# Patient Record
Sex: FEMALE | Race: WHITE | NOT HISPANIC OR LATINO
[De-identification: names, ages, dates, MRNs, and addresses within clinical notes are randomized per-mention and may not be internally consistent; named-entity substitution may affect disease eponyms.]

---

## 2019-11-20 ENCOUNTER — APPOINTMENT (OUTPATIENT)
Dept: ORTHOPEDIC SURGERY | Facility: CLINIC | Age: 39
End: 2019-11-20
Payer: COMMERCIAL

## 2019-11-20 VITALS
SYSTOLIC BLOOD PRESSURE: 119 MMHG | HEIGHT: 69 IN | TEMPERATURE: 98 F | HEART RATE: 71 BPM | WEIGHT: 180 LBS | DIASTOLIC BLOOD PRESSURE: 75 MMHG | BODY MASS INDEX: 26.66 KG/M2

## 2019-11-20 DIAGNOSIS — Z82.62 FAMILY HISTORY OF OSTEOPOROSIS: ICD-10-CM

## 2019-11-20 DIAGNOSIS — M17.11 UNILATERAL PRIMARY OSTEOARTHRITIS, RIGHT KNEE: ICD-10-CM

## 2019-11-20 DIAGNOSIS — Z87.39 PERSONAL HISTORY OF OTHER DISEASES OF THE MUSCULOSKELETAL SYSTEM AND CONNECTIVE TISSUE: ICD-10-CM

## 2019-11-20 DIAGNOSIS — Z72.89 OTHER PROBLEMS RELATED TO LIFESTYLE: ICD-10-CM

## 2019-11-20 PROCEDURE — 73560 X-RAY EXAM OF KNEE 1 OR 2: CPT | Mod: RT

## 2019-11-20 PROCEDURE — 99203 OFFICE O/P NEW LOW 30 MIN: CPT

## 2019-11-25 NOTE — DISCUSSION/SUMMARY
[de-identified] : At this time, due to patellofemoral arthritis of the right knee, I recommended rest, ice and anti-inflammatory.  She will be reassessed in two to three weeks to discuss the potential for a cortisone injection.

## 2019-11-25 NOTE — HISTORY OF PRESENT ILLNESS
[de-identified] : The patient comes in today with complaints to her right knee.  She states she was doing a lot of sports and lunges and had increasing complaints of pain over the last couple of months.  The patient states the onset/injury occurred on 9/1/19.  This injury is not work related or due to an automobile accident.  The patient states the pain is intermittent and localized.  The patient describes the pain as intermittent sharp pain with activity.  The patient notes rest and elevating the left makes her symptoms better, while getting up from a seated position, lateral movement, going downstairs, putting weight on it and kneeling, makes her symptoms worse.

## 2019-11-25 NOTE — PHYSICAL EXAM
[de-identified] : Ambulating with a slightly antalgic to antalgic gait.  Station:  Normal.  [de-identified] : Left Knee: \par Range of Motion in Degrees	\par 	                  Claimant:	Normal:	\par Flexion Active	  135 	                135-degrees	\par Flexion Passive	  135	                135-degrees	\par Extension Active	  0-5	                0-5-degrees	\par Extension Passive	  0-5	                0-5-degrees	\par \par No weakness to flexion/extension.  No evidence of instability in the AP plane or varus or valgus stress.  Negative  Lachman.  Negative pivot shift.  Negative anterior drawer test.  Negative posterior drawer test.  Negative Tonja.  Negative Apley grind.  No medial or lateral joint line tenderness.  No tenderness over the medial and lateral facet of the patella.  No patellofemoral crepitations.  No lateral tilting patella.  No patellar apprehension.  No crepitation in the medial and lateral femoral condyle.  No proximal or distal swelling, edema or tenderness.  No gross motor or sensory deficits.  No intra-articular swelling.  2+ DP and PT pulses. No varus or valgus malalignment.  Skin is intact.  No rashes, scars or lesions.  \par  \par Right Knee: \par Range of Motion in Degrees	\par 	                  Claimant:	Normal:	\par Flexion Active	  135 	                135-degrees	\par Flexion Passive	  135	                135-degrees	\par Extension Active	  0-5	                0-5-degrees	\par Extension Passive	  0-5	                0-5-degrees	\par \par No weakness to flexion/extension.  No evidence of instability in the AP plane or varus or valgus stress.  Negative  Lachman.  Negative pivot shift.  Negative anterior drawer test.  Negative posterior drawer test.  Negative Tonja.  Negative Apley grind.  No medial or lateral joint line tenderness.  Positive tenderness over the lateral facet of the patella.  No tenderness over the medial facet of the patella.  Positive patellofemoral crepitations.  No lateral tilting patella.  No patella apprehension.  No crepitation in the medial and lateral femoral condyle.  No proximal or distal swelling, edema or tenderness.  No gross motor or sensory deficits.  Moderate effusion.  2+ DP and PT pulses.  No varus or valgus malalignment.  Skin is intact.  No rashes, scars or lesions.  \par  [de-identified] : Appearance:  Well-developed, well-nourished female in no acute distress.\par \par   [de-identified] : Radiographs, two views of the right knee, show no obvious osseous abnormality.

## 2019-11-25 NOTE — ADDENDUM
[FreeTextEntry1] : This note was written by Oneyda Mayfield on 11/25/2019 acting as a scribe for WONG SOLIZ III, MD

## 2019-12-02 ENCOUNTER — APPOINTMENT (OUTPATIENT)
Dept: ORTHOPEDIC SURGERY | Facility: CLINIC | Age: 39
End: 2019-12-02

## 2019-12-04 ENCOUNTER — APPOINTMENT (OUTPATIENT)
Dept: ORTHOPEDIC SURGERY | Facility: CLINIC | Age: 39
End: 2019-12-04

## 2022-06-10 ENCOUNTER — APPOINTMENT (OUTPATIENT)
Dept: INTERNAL MEDICINE | Facility: CLINIC | Age: 42
End: 2022-06-10
Payer: COMMERCIAL

## 2022-06-10 ENCOUNTER — NON-APPOINTMENT (OUTPATIENT)
Age: 42
End: 2022-06-10

## 2022-06-10 VITALS
TEMPERATURE: 97.2 F | BODY MASS INDEX: 30.21 KG/M2 | SYSTOLIC BLOOD PRESSURE: 127 MMHG | HEIGHT: 69 IN | OXYGEN SATURATION: 100 % | RESPIRATION RATE: 15 BRPM | DIASTOLIC BLOOD PRESSURE: 81 MMHG | WEIGHT: 204 LBS | HEART RATE: 78 BPM

## 2022-06-10 DIAGNOSIS — Z00.00 ENCOUNTER FOR GENERAL ADULT MEDICAL EXAMINATION W/OUT ABNORMAL FINDINGS: ICD-10-CM

## 2022-06-10 DIAGNOSIS — Z82.69 FAMILY HISTORY OF OTHER DISEASES OF THE MUSCULOSKELETAL SYSTEM AND CONNECTIVE TISSUE: ICD-10-CM

## 2022-06-10 PROCEDURE — G0442 ANNUAL ALCOHOL SCREEN 15 MIN: CPT

## 2022-06-10 PROCEDURE — 36415 COLL VENOUS BLD VENIPUNCTURE: CPT

## 2022-06-10 PROCEDURE — 93000 ELECTROCARDIOGRAM COMPLETE: CPT | Mod: 59

## 2022-06-10 PROCEDURE — G0444 DEPRESSION SCREEN ANNUAL: CPT | Mod: 59

## 2022-06-10 PROCEDURE — 99386 PREV VISIT NEW AGE 40-64: CPT | Mod: 25

## 2022-06-10 NOTE — ASSESSMENT
[FreeTextEntry1] : Pearl is a 43 yo F w no pertinent PMHx here for CPE and to establish care \par Patient feels well. Her main concern is her weight and the anxiety of being a mom to triplets. She has been on zoloft 50 mg for the past 2 years and was doing fine until recently. She has been attempting to lose weight. Has done weight watchers and was unsuccessful. She is currently doing keto for the past three weeks and has only lost 3 lb. Has been eating less than 20 net carbs daily and three meals. She has also been counting calories and exercising (beach body). She thinks that most of her anxiety at the moment roots from her being on a diet. She denies depression, intentions to harm herself or other \par \par Gyn , had vasa previa, emergent C section \par She is on birth control (combined) \par \par HCM \par Mammo - NORMAL \par Pap smear - NORMAL\par Flu vaccine- Did not have last year\par Tdap- \par Depression screening- negative  \par Alcohol abuse screening- negative \par EKG - nsr\par \par BMI 30 \par  Explained amount of protein fat and carbs that are allowed on eat meal. Needs also to follow a caloric deficit.. Good sources of good fats such as avocado, nuts, fish, good source of protein eggs, white part of chicken, beef from time to time. Good source of carbs low carb wraps, green veggies etc... also needs to start exercising. Recommended around 45 min 4-5 times a week of moderate intensity work outs. Patient understood and agreed with plan.\par Fu 3 mo goal to lose 15 lb \par \par Anxiety \par Patient not interested in increasing zoloft \par Advised therapist \par Engage in relaxing activities, plan in advance, 8 hours of sleep. \par

## 2022-06-10 NOTE — HEALTH RISK ASSESSMENT
[Excellent] : ~his/her~ current health as excellent [Never] : Never [Yes] : Yes [Monthly or less (1 pt)] : Monthly or less (1 point) [1 or 2 (0 pts)] : 1 or 2 (0 points) [Never (0 pts)] : Never (0 points) [No] : In the past 12 months have you used drugs other than those required for medical reasons? No [No falls in past year] : Patient reported no falls in the past year [0] : 2) Feeling down, depressed, or hopeless: Not at all (0) [PHQ-2 Negative - No further assessment needed] : PHQ-2 Negative - No further assessment needed [Patient reported mammogram was normal] : Patient reported mammogram was normal [Patient reported PAP Smear was normal] : Patient reported PAP Smear was normal [HIV test declined] : HIV test declined [Hepatitis C test declined] : Hepatitis C test declined [None] : None [With Family] : lives with family [# of Members in Household ___] :  household currently consist of [unfilled] member(s) [Employed] : employed [College] : College [] :  [# Of Children ___] : has [unfilled] children [Sexually Active] : sexually active [Feels Safe at Home] : Feels safe at home [Fully functional (bathing, dressing, toileting, transferring, walking, feeding)] : Fully functional (bathing, dressing, toileting, transferring, walking, feeding) [With Patient/Caregiver] : , with patient/caregiver [Reviewed updated] : Reviewed, updated [Designated Healthcare Proxy] : Designated healthcare proxy [Name: ___] : Health Care Proxy's Name: [unfilled]  [Relationship: ___] : Relationship: [unfilled] [Aggressive treatment] : aggressive treatment [I will adhere to the patient's wishes.] : I will adhere to the patient's wishes. [Very Good] : ~his/her~  mood as very good [FreeTextEntry1] : Anxiety, weight  [Audit-CScore] : 0 [de-identified] : Working out  [de-identified] : Keto-low carb diet  [[id number]] : 0 [Change in mental status noted] : No change in mental status noted [Language] : denies difficulty with language [Behavior] : denies difficulty with behavior [Learning/Retaining New Information] : denies difficulty learning/retaining new information [Handling Complex Tasks] : denies difficulty handling complex tasks [Reasoning] : denies difficulty with reasoning [Spatial Ability and Orientation] : denies difficulty with spatial ability and orientation [Reports changes in hearing] : Reports no changes in hearing [Reports changes in vision] : Reports no changes in vision [MammogramDate] : 2022 [PapSmearDate] : 10/2021 [FreeTextEntry2] :   [AdvancecareDate] : 6/10/2022

## 2022-06-10 NOTE — HISTORY OF PRESENT ILLNESS
[FreeTextEntry1] : CPE/NP  [de-identified] : Pearl is a 43 yo F w no pertinent PMHx here for CPE and to establish care \par Patient feels well. Her main concern is her weight and the anxiety of being a mom to triplets. She has been on zoloft 50 mg for the past 2 years and was doing fine until recently. She has been attempting to lose weight. Has done weight watchers and was unsuccessful. She is currently doing keto for the past three weeks and has only lost 3 lb. Has been eating less than 20 net carbs daily and three meals. She has also been counting calories and exercising (beach body). She thinks that most of her anxiety at the moment roots from her being on a diet. She denies depression, intentions to harm herself or other \par \par Gyn , had vasa previa, emergent C section \par She is on birth control (combined) \par \par HCM \par Mammo - NORMAL \par Pap smear - NORMAL\par Flu vaccine- Did not have last year\par Tdap- \par Depression screening- negative  \par Alcohol abuse screening- negative \par EKG - nsr

## 2022-06-10 NOTE — HEALTH RISK ASSESSMENT
[Excellent] : ~his/her~ current health as excellent [Never] : Never [Yes] : Yes [Monthly or less (1 pt)] : Monthly or less (1 point) [1 or 2 (0 pts)] : 1 or 2 (0 points) [Never (0 pts)] : Never (0 points) [No] : In the past 12 months have you used drugs other than those required for medical reasons? No [No falls in past year] : Patient reported no falls in the past year [0] : 2) Feeling down, depressed, or hopeless: Not at all (0) [PHQ-2 Negative - No further assessment needed] : PHQ-2 Negative - No further assessment needed [Patient reported mammogram was normal] : Patient reported mammogram was normal [Patient reported PAP Smear was normal] : Patient reported PAP Smear was normal [HIV test declined] : HIV test declined [Hepatitis C test declined] : Hepatitis C test declined [None] : None [With Family] : lives with family [# of Members in Household ___] :  household currently consist of [unfilled] member(s) [Employed] : employed [College] : College [] :  [# Of Children ___] : has [unfilled] children [Sexually Active] : sexually active [Feels Safe at Home] : Feels safe at home [Fully functional (bathing, dressing, toileting, transferring, walking, feeding)] : Fully functional (bathing, dressing, toileting, transferring, walking, feeding) [With Patient/Caregiver] : , with patient/caregiver [Reviewed updated] : Reviewed, updated [Designated Healthcare Proxy] : Designated healthcare proxy [Name: ___] : Health Care Proxy's Name: [unfilled]  [Relationship: ___] : Relationship: [unfilled] [Aggressive treatment] : aggressive treatment [I will adhere to the patient's wishes.] : I will adhere to the patient's wishes. [Very Good] : ~his/her~  mood as very good [FreeTextEntry1] : Anxiety, weight  [Audit-CScore] : 0 [de-identified] : Working out  [de-identified] : Keto-low carb diet  [[id number]] : 0 [Change in mental status noted] : No change in mental status noted [Language] : denies difficulty with language [Behavior] : denies difficulty with behavior [Learning/Retaining New Information] : denies difficulty learning/retaining new information [Handling Complex Tasks] : denies difficulty handling complex tasks [Reasoning] : denies difficulty with reasoning [Spatial Ability and Orientation] : denies difficulty with spatial ability and orientation [Reports changes in hearing] : Reports no changes in hearing [Reports changes in vision] : Reports no changes in vision [MammogramDate] : 2022 [PapSmearDate] : 10/2021 [FreeTextEntry2] :   [AdvancecareDate] : 6/10/2022

## 2022-06-10 NOTE — HISTORY OF PRESENT ILLNESS
[FreeTextEntry1] : CPE/NP  [de-identified] : Pearl is a 43 yo F w no pertinent PMHx here for CPE and to establish care \par Patient feels well. Her main concern is her weight and the anxiety of being a mom to triplets. She has been on zoloft 50 mg for the past 2 years and was doing fine until recently. She has been attempting to lose weight. Has done weight watchers and was unsuccessful. She is currently doing keto for the past three weeks and has only lost 3 lb. Has been eating less than 20 net carbs daily and three meals. She has also been counting calories and exercising (beach body). She thinks that most of her anxiety at the moment roots from her being on a diet. She denies depression, intentions to harm herself or other \par \par Gyn , had vasa previa, emergent C section \par She is on birth control (combined) \par \par HCM \par Mammo - NORMAL \par Pap smear - NORMAL\par Flu vaccine- Did not have last year\par Tdap- \par Depression screening- negative  \par Alcohol abuse screening- negative \par EKG - nsr

## 2022-06-10 NOTE — ASSESSMENT
[FreeTextEntry1] : Pearl is a 41 yo F w no pertinent PMHx here for CPE and to establish care \par Patient feels well. Her main concern is her weight and the anxiety of being a mom to triplets. She has been on zoloft 50 mg for the past 2 years and was doing fine until recently. She has been attempting to lose weight. Has done weight watchers and was unsuccessful. She is currently doing keto for the past three weeks and has only lost 3 lb. Has been eating less than 20 net carbs daily and three meals. She has also been counting calories and exercising (beach body). She thinks that most of her anxiety at the moment roots from her being on a diet. She denies depression, intentions to harm herself or other \par \par Gyn , had vasa previa, emergent C section \par She is on birth control (combined) \par \par HCM \par Mammo - NORMAL \par Pap smear - NORMAL\par Flu vaccine- Did not have last year\par Tdap- \par Depression screening- negative  \par Alcohol abuse screening- negative \par EKG - nsr\par \par BMI 30 \par  Explained amount of protein fat and carbs that are allowed on eat meal. Needs also to follow a caloric deficit.. Good sources of good fats such as avocado, nuts, fish, good source of protein eggs, white part of chicken, beef from time to time. Good source of carbs low carb wraps, green veggies etc... also needs to start exercising. Recommended around 45 min 4-5 times a week of moderate intensity work outs. Patient understood and agreed with plan.\par Fu 3 mo goal to lose 15 lb \par \par Anxiety \par Patient not interested in increasing zoloft \par Advised therapist \par Engage in relaxing activities, plan in advance, 8 hours of sleep. \par

## 2022-06-12 LAB
25(OH)D3 SERPL-MCNC: 30.9 NG/ML
ALBUMIN SERPL ELPH-MCNC: 4.7 G/DL
ALP BLD-CCNC: 43 U/L
ALT SERPL-CCNC: 17 U/L
ANION GAP SERPL CALC-SCNC: 15 MMOL/L
APPEARANCE: ABNORMAL
AST SERPL-CCNC: 21 U/L
BACTERIA: ABNORMAL
BASOPHILS # BLD AUTO: 0.05 K/UL
BASOPHILS NFR BLD AUTO: 1 %
BILIRUB SERPL-MCNC: 0.2 MG/DL
BILIRUBIN URINE: NEGATIVE
BLOOD URINE: ABNORMAL
BUN SERPL-MCNC: 17 MG/DL
CALCIUM OXALATE CRYSTALS: ABNORMAL
CALCIUM SERPL-MCNC: 9.8 MG/DL
CHLORIDE SERPL-SCNC: 102 MMOL/L
CHOLEST SERPL-MCNC: 195 MG/DL
CO2 SERPL-SCNC: 23 MMOL/L
COLOR: YELLOW
CREAT SERPL-MCNC: 0.72 MG/DL
EGFR: 108 ML/MIN/1.73M2
EOSINOPHIL # BLD AUTO: 0.08 K/UL
EOSINOPHIL NFR BLD AUTO: 1.7 %
ESTIMATED AVERAGE GLUCOSE: 97 MG/DL
GLUCOSE QUALITATIVE U: NEGATIVE
GLUCOSE SERPL-MCNC: 81 MG/DL
HBA1C MFR BLD HPLC: 5 %
HCT VFR BLD CALC: 39.2 %
HDLC SERPL-MCNC: 105 MG/DL
HGB BLD-MCNC: 12.5 G/DL
HYALINE CASTS: 0 /LPF
IMM GRANULOCYTES NFR BLD AUTO: 0.2 %
KETONES URINE: ABNORMAL
LDLC SERPL CALC-MCNC: 75 MG/DL
LEUKOCYTE ESTERASE URINE: NEGATIVE
LYMPHOCYTES # BLD AUTO: 1.36 K/UL
LYMPHOCYTES NFR BLD AUTO: 28.5 %
MAN DIFF?: NORMAL
MCHC RBC-ENTMCNC: 30.6 PG
MCHC RBC-ENTMCNC: 31.9 GM/DL
MCV RBC AUTO: 96.1 FL
MICROSCOPIC-UA: NORMAL
MONOCYTES # BLD AUTO: 0.44 K/UL
MONOCYTES NFR BLD AUTO: 9.2 %
NEUTROPHILS # BLD AUTO: 2.83 K/UL
NEUTROPHILS NFR BLD AUTO: 59.4 %
NITRITE URINE: NEGATIVE
NONHDLC SERPL-MCNC: 89 MG/DL
PH URINE: 6
PLATELET # BLD AUTO: 198 K/UL
POTASSIUM SERPL-SCNC: 4.5 MMOL/L
PROT SERPL-MCNC: 7.5 G/DL
PROTEIN URINE: NORMAL
RBC # BLD: 4.08 M/UL
RBC # FLD: 13 %
RED BLOOD CELLS URINE: 4 /HPF
SODIUM SERPL-SCNC: 141 MMOL/L
SPECIFIC GRAVITY URINE: 1.03
SQUAMOUS EPITHELIAL CELLS: 14 /HPF
TRIGL SERPL-MCNC: 72 MG/DL
TSH SERPL-ACNC: 1.02 UIU/ML
UROBILINOGEN URINE: NORMAL
WBC # FLD AUTO: 4.77 K/UL
WHITE BLOOD CELLS URINE: 3 /HPF

## 2022-06-15 ENCOUNTER — NON-APPOINTMENT (OUTPATIENT)
Age: 42
End: 2022-06-15

## 2022-06-27 ENCOUNTER — TRANSCRIPTION ENCOUNTER (OUTPATIENT)
Age: 42
End: 2022-06-27

## 2022-09-14 ENCOUNTER — APPOINTMENT (OUTPATIENT)
Dept: INTERNAL MEDICINE | Facility: CLINIC | Age: 42
End: 2022-09-14

## 2022-11-10 ENCOUNTER — TRANSCRIPTION ENCOUNTER (OUTPATIENT)
Age: 42
End: 2022-11-10

## 2022-11-10 ENCOUNTER — NON-APPOINTMENT (OUTPATIENT)
Age: 42
End: 2022-11-10

## 2023-01-13 ENCOUNTER — APPOINTMENT (OUTPATIENT)
Dept: INTERNAL MEDICINE | Facility: CLINIC | Age: 43
End: 2023-01-13
Payer: COMMERCIAL

## 2023-01-13 VITALS
WEIGHT: 222 LBS | DIASTOLIC BLOOD PRESSURE: 81 MMHG | TEMPERATURE: 97.2 F | BODY MASS INDEX: 32.88 KG/M2 | HEART RATE: 92 BPM | SYSTOLIC BLOOD PRESSURE: 119 MMHG | HEIGHT: 69 IN | OXYGEN SATURATION: 99 %

## 2023-01-13 PROCEDURE — 99214 OFFICE O/P EST MOD 30 MIN: CPT

## 2023-01-13 NOTE — ASSESSMENT
[FreeTextEntry1] : Pearl is a 42 yo F w PMHx anxiety here for follow up \par Patient states that she has not been sleeping well due to sinus pressure. \par Does not like how she feels on the higher dose of sertraline \par Sinuses feel clogged, dry- tried humidifier. \par Working on diet. \par \par Anxiety \par Advised therapy-  counseling provided, options for online therapy such as [facility] \par Sertraline 50 mg, will add buspar 5 mg, patient will f/u 6 weeks to assess response to tx \par \par Obesity \par  Explained amount of protein fat and carbs that are allowed on eat meal. Needs also to follow a caloric deficit. He will start using [employer] it yomaira to log food and track calories, carbs and fats. Good sources of good fats such as avocado, nuts, fish, good source of protein eggs, white part of chicken, beef from time to time. Good source of carbs low carb wraps, green veggies etc... also needs to start exercising. Recommended around 45 min 4-5 times a week of moderate intensity work outs. Patient understood and agreed with plan.\par \par Sinus congestion \par Flonase \par Cont humidifier \par Netti pot \par \par \par \par Fi 6 weeks

## 2023-01-13 NOTE — HISTORY OF PRESENT ILLNESS
[FreeTextEntry1] : anxiety, sinus congestion, weight  [de-identified] : Pearl is a 42 yo F w PMHx anxiety here for follow up \par Patient states that she has not been sleeping well. Does not like how she feels on the higher dose of sertraline \par Sinuses feel clogged, dry- tried humidifier. \par Working on diet.

## 2023-02-27 ENCOUNTER — APPOINTMENT (OUTPATIENT)
Dept: INTERNAL MEDICINE | Facility: CLINIC | Age: 43
End: 2023-02-27
Payer: COMMERCIAL

## 2023-02-27 VITALS
TEMPERATURE: 97 F | SYSTOLIC BLOOD PRESSURE: 120 MMHG | RESPIRATION RATE: 15 BRPM | DIASTOLIC BLOOD PRESSURE: 80 MMHG | HEART RATE: 76 BPM | OXYGEN SATURATION: 99 % | HEIGHT: 69 IN | WEIGHT: 218 LBS | BODY MASS INDEX: 32.29 KG/M2

## 2023-02-27 DIAGNOSIS — Z87.09 PERSONAL HISTORY OF OTHER DISEASES OF THE RESPIRATORY SYSTEM: ICD-10-CM

## 2023-02-27 PROCEDURE — 99213 OFFICE O/P EST LOW 20 MIN: CPT

## 2023-02-27 NOTE — HISTORY OF PRESENT ILLNESS
[FreeTextEntry1] : FU ANXIETY [de-identified] : Pearl is a 44 yo F w PMHx anxiety here for follow up\par That anxiety is well controlled with addition of buspar, has tolerated well \par Looking into therapy to learn coping mechanisms for anxiety. \par Has started to work out and eat healthier\par Has an eczema patch on face which she is using mometasone cream, slowly improving \par \par Otherwise feels well and denies any acute complaints

## 2023-02-27 NOTE — ASSESSMENT
[FreeTextEntry1] : Pearl is a 42 yo F w PMHx anxiety here for follow up\par That anxiety is well controlled with addition of buspar, has tolerated well \par Looking into therapy to learn coping mechanisms for anxiety. \par Has started to work out and eat healthier\par Has an eczema patch on face which she is using mometasone cream, slowly improving \par \par Otherwise feels well and denies any acute complaints\par \par RTO 6 MO

## 2023-03-07 ENCOUNTER — TRANSCRIPTION ENCOUNTER (OUTPATIENT)
Age: 43
End: 2023-03-07

## 2023-04-10 ENCOUNTER — RX RENEWAL (OUTPATIENT)
Age: 43
End: 2023-04-10

## 2023-07-06 ENCOUNTER — APPOINTMENT (OUTPATIENT)
Dept: DERMATOLOGY | Facility: CLINIC | Age: 43
End: 2023-07-06
Payer: COMMERCIAL

## 2023-07-06 PROCEDURE — 99203 OFFICE O/P NEW LOW 30 MIN: CPT

## 2023-07-07 ENCOUNTER — RX RENEWAL (OUTPATIENT)
Age: 43
End: 2023-07-07

## 2023-08-29 ENCOUNTER — APPOINTMENT (OUTPATIENT)
Dept: INTERNAL MEDICINE | Facility: CLINIC | Age: 43
End: 2023-08-29

## 2023-11-13 ENCOUNTER — APPOINTMENT (OUTPATIENT)
Dept: INTERNAL MEDICINE | Facility: CLINIC | Age: 43
End: 2023-11-13
Payer: COMMERCIAL

## 2023-11-13 VITALS
TEMPERATURE: 98.9 F | SYSTOLIC BLOOD PRESSURE: 122 MMHG | WEIGHT: 220 LBS | HEIGHT: 69 IN | RESPIRATION RATE: 16 BRPM | OXYGEN SATURATION: 98 % | BODY MASS INDEX: 32.58 KG/M2 | DIASTOLIC BLOOD PRESSURE: 85 MMHG | HEART RATE: 91 BPM

## 2023-11-13 DIAGNOSIS — F41.9 ANXIETY DISORDER, UNSPECIFIED: ICD-10-CM

## 2023-11-13 PROCEDURE — 99214 OFFICE O/P EST MOD 30 MIN: CPT

## 2024-02-08 ENCOUNTER — RX RENEWAL (OUTPATIENT)
Age: 44
End: 2024-02-08

## 2024-02-09 ENCOUNTER — RX RENEWAL (OUTPATIENT)
Age: 44
End: 2024-02-09

## 2024-05-08 ENCOUNTER — RX RENEWAL (OUTPATIENT)
Age: 44
End: 2024-05-08

## 2024-05-08 RX ORDER — SERTRALINE HYDROCHLORIDE 50 MG/1
50 TABLET, FILM COATED ORAL
Qty: 90 | Refills: 0 | Status: ACTIVE | COMMUNITY
Start: 2022-06-10 | End: 1900-01-01

## 2024-05-09 ENCOUNTER — RX RENEWAL (OUTPATIENT)
Age: 44
End: 2024-05-09

## 2024-05-09 RX ORDER — BUSPIRONE HYDROCHLORIDE 5 MG/1
5 TABLET ORAL
Qty: 90 | Refills: 0 | Status: ACTIVE | COMMUNITY
Start: 2023-01-13 | End: 1900-01-01

## 2024-07-30 ENCOUNTER — NON-APPOINTMENT (OUTPATIENT)
Age: 44
End: 2024-07-30

## 2024-08-02 ENCOUNTER — APPOINTMENT (OUTPATIENT)
Dept: FAMILY MEDICINE | Facility: CLINIC | Age: 44
End: 2024-08-02
Payer: COMMERCIAL

## 2024-08-02 VITALS
BODY MASS INDEX: 30.21 KG/M2 | TEMPERATURE: 97 F | OXYGEN SATURATION: 93 % | HEIGHT: 69 IN | SYSTOLIC BLOOD PRESSURE: 92 MMHG | HEART RATE: 87 BPM | DIASTOLIC BLOOD PRESSURE: 60 MMHG | WEIGHT: 204 LBS

## 2024-08-02 DIAGNOSIS — Z87.891 PERSONAL HISTORY OF NICOTINE DEPENDENCE: ICD-10-CM

## 2024-08-02 DIAGNOSIS — R79.81 ABNORMAL BLOOD-GAS LEVEL: ICD-10-CM

## 2024-08-02 DIAGNOSIS — Z76.89 PERSONS ENCOUNTERING HEALTH SERVICES IN OTHER SPECIFIED CIRCUMSTANCES: ICD-10-CM

## 2024-08-02 DIAGNOSIS — Z09 ENCOUNTER FOR FOLLOW-UP EXAMINATION AFTER COMPLETED TREATMENT FOR CONDITIONS OTHER THAN MALIGNANT NEOPLASM: ICD-10-CM

## 2024-08-02 DIAGNOSIS — J18.9 PNEUMONIA, UNSPECIFIED ORGANISM: ICD-10-CM

## 2024-08-02 DIAGNOSIS — J15.7 PNEUMONIA DUE TO MYCOPLASMA PNEUMONIAE: ICD-10-CM

## 2024-08-02 PROCEDURE — 99496 TRANSJ CARE MGMT HIGH F2F 7D: CPT

## 2024-08-02 RX ORDER — DROSPIRENONE AND ETHINYL ESTRADIOL 0.02-3(28)
KIT ORAL
Refills: 0 | Status: ACTIVE | COMMUNITY

## 2024-08-02 RX ORDER — SERTRALINE HYDROCHLORIDE 50 MG/1
50 TABLET, FILM COATED ORAL
Refills: 0 | Status: COMPLETED | COMMUNITY

## 2024-08-02 RX ORDER — LEVOFLOXACIN 750 MG/1
750 TABLET, FILM COATED ORAL
Refills: 0 | Status: ACTIVE | COMMUNITY

## 2024-08-02 NOTE — REVIEW OF SYSTEMS
[Shortness Of Breath] : shortness of breath [Cough] : cough [Fever] : no fever [Chills] : no chills [Chest Pain] : no chest pain [Palpitations] : no palpitations [Wheezing] : no wheezing [Abdominal Pain] : no abdominal pain [Nausea] : no nausea [Vomiting] : no vomiting [Headache] : no headache [Dizziness] : no dizziness [Fainting] : no fainting

## 2024-08-02 NOTE — HISTORY OF PRESENT ILLNESS
[Patient Contacted By: ____] : and contacted by [unfilled] [Post-hospitalization from ___ Hospital] : Post-hospitalization from [unfilled] Hospital [Admitted on: ___] : The patient was admitted on [unfilled] [Discharged on ___] : discharged on [unfilled] [Pertinent Labs] : pertinent labs [Discharge Summary] : discharge summary [FreeTextEntry2] : 44 year old female presents or HFU for mycoplasma pneumonia. States on 7/15 was starting to feel sick with sweats and fever. Went to urgent care and got zpak. Still did not feel better so went back to urgent care on 7/21 and got prendisone, albuterol inhaler and switched to bactrim. Was worsening and 2 days later woke up with blue lips. Went to ED and oxygen saturating at 87%. Was admitted and diagnosed with mycoplasma pna. Given IV abx- initially Levaquin and vanco and was then switched to PO meds. She was evaluated by pulmonology- had bronchoscopy which is how they diagnosed her pna. She has follow up with pulmonology 8/21.  In the hospital also had an EKG, TTE, LE doppler which was normal.  She was discharged on home oxygen and PO abx.  Her oxygen remains low on RA and she requires O2, especially with ambulation where spO2 87% without oxygen

## 2024-08-02 NOTE — HEALTH RISK ASSESSMENT
[Former] : Former [0-4] : 0-4 [< 15 Years] : < 15 Years [de-identified] : quit cigarettes 12 years ago, 7 years 1/2 PPD

## 2024-08-02 NOTE — PHYSICAL EXAM
[No Acute Distress] : no acute distress [Normal Sclera/Conjunctiva] : normal sclera/conjunctiva [PERRL] : pupils equal round and reactive to light [No Respiratory Distress] : no respiratory distress  [No Accessory Muscle Use] : no accessory muscle use [Normal Rate] : normal rate  [Regular Rhythm] : with a regular rhythm [Soft] : abdomen soft [Non Tender] : non-tender [Non-distended] : non-distended [Normal Bowel Sounds] : normal bowel sounds [No Focal Deficits] : no focal deficits [Normal Affect] : the affect was normal [Normal Insight/Judgement] : insight and judgment were intact [de-identified] : mild wheeze on L

## 2024-08-02 NOTE — PLAN
[FreeTextEntry1] : PNA - remains on O2 - cont PO abx - has pulm f/u in 3 weeks - advised will sign rx for portable oxygen - recommend ID consult  - trial of breztri. Has albuterol nebs at home  - f/u cbc and bmp

## 2024-08-03 LAB
ALBUMIN SERPL ELPH-MCNC: 4.2 G/DL
ALP BLD-CCNC: 69 U/L
ALT SERPL-CCNC: 9 U/L
ANION GAP SERPL CALC-SCNC: 15 MMOL/L
AST SERPL-CCNC: 15 U/L
BASOPHILS # BLD AUTO: 0.07 K/UL
BASOPHILS NFR BLD AUTO: 1.4 %
BILIRUB SERPL-MCNC: 0.4 MG/DL
BUN SERPL-MCNC: 12 MG/DL
CALCIUM SERPL-MCNC: 9.6 MG/DL
CHLORIDE SERPL-SCNC: 101 MMOL/L
CO2 SERPL-SCNC: 22 MMOL/L
CREAT SERPL-MCNC: 0.7 MG/DL
EGFR: 111 ML/MIN/1.73M2
EOSINOPHIL # BLD AUTO: 0.12 K/UL
EOSINOPHIL NFR BLD AUTO: 2.4 %
GLUCOSE SERPL-MCNC: 104 MG/DL
HCT VFR BLD CALC: 38.2 %
HGB BLD-MCNC: 12.9 G/DL
IMM GRANULOCYTES NFR BLD AUTO: 0.2 %
LYMPHOCYTES # BLD AUTO: 1.11 K/UL
LYMPHOCYTES NFR BLD AUTO: 22 %
MAN DIFF?: NORMAL
MCHC RBC-ENTMCNC: 33 PG
MCHC RBC-ENTMCNC: 33.8 GM/DL
MCV RBC AUTO: 97.7 FL
MONOCYTES # BLD AUTO: 0.38 K/UL
MONOCYTES NFR BLD AUTO: 7.5 %
NEUTROPHILS # BLD AUTO: 3.35 K/UL
NEUTROPHILS NFR BLD AUTO: 66.5 %
PLATELET # BLD AUTO: 347 K/UL
POTASSIUM SERPL-SCNC: 4.9 MMOL/L
PROT SERPL-MCNC: 7.7 G/DL
RBC # BLD: 3.91 M/UL
RBC # FLD: 13 %
SODIUM SERPL-SCNC: 137 MMOL/L
WBC # FLD AUTO: 5.04 K/UL

## 2024-08-06 ENCOUNTER — APPOINTMENT (OUTPATIENT)
Dept: INTERNAL MEDICINE | Facility: CLINIC | Age: 44
End: 2024-08-06

## 2024-08-12 ENCOUNTER — APPOINTMENT (OUTPATIENT)
Dept: INTERNAL MEDICINE | Facility: CLINIC | Age: 44
End: 2024-08-12

## 2025-03-11 ENCOUNTER — APPOINTMENT (OUTPATIENT)
Dept: FAMILY MEDICINE | Facility: CLINIC | Age: 45
End: 2025-03-11
Payer: COMMERCIAL

## 2025-03-11 VITALS
SYSTOLIC BLOOD PRESSURE: 114 MMHG | OXYGEN SATURATION: 98 % | DIASTOLIC BLOOD PRESSURE: 72 MMHG | BODY MASS INDEX: 29.33 KG/M2 | HEART RATE: 70 BPM | HEIGHT: 69 IN | TEMPERATURE: 98 F | WEIGHT: 198 LBS

## 2025-03-11 DIAGNOSIS — Z00.00 ENCOUNTER FOR GENERAL ADULT MEDICAL EXAMINATION W/OUT ABNORMAL FINDINGS: ICD-10-CM

## 2025-03-11 DIAGNOSIS — Z13.1 ENCOUNTER FOR SCREENING FOR DIABETES MELLITUS: ICD-10-CM

## 2025-03-11 DIAGNOSIS — Z13.220 ENCOUNTER FOR SCREENING FOR LIPOID DISORDERS: ICD-10-CM

## 2025-03-11 PROCEDURE — 99396 PREV VISIT EST AGE 40-64: CPT

## 2025-03-13 LAB
ALBUMIN SERPL ELPH-MCNC: 4.3 G/DL
ALP BLD-CCNC: 44 U/L
ALT SERPL-CCNC: 12 U/L
ANION GAP SERPL CALC-SCNC: 13 MMOL/L
AST SERPL-CCNC: 15 U/L
BASOPHILS # BLD AUTO: 0.03 K/UL
BASOPHILS NFR BLD AUTO: 0.6 %
BILIRUB SERPL-MCNC: 0.4 MG/DL
BUN SERPL-MCNC: 10 MG/DL
CALCIUM SERPL-MCNC: 10 MG/DL
CHLORIDE SERPL-SCNC: 103 MMOL/L
CHOLEST SERPL-MCNC: 197 MG/DL
CO2 SERPL-SCNC: 25 MMOL/L
CREAT SERPL-MCNC: 0.64 MG/DL
EGFRCR SERPLBLD CKD-EPI 2021: 112 ML/MIN/1.73M2
EOSINOPHIL # BLD AUTO: 0.07 K/UL
EOSINOPHIL NFR BLD AUTO: 1.5 %
ESTIMATED AVERAGE GLUCOSE: 88 MG/DL
GLUCOSE SERPL-MCNC: 80 MG/DL
HBA1C MFR BLD HPLC: 4.7 %
HCT VFR BLD CALC: 40 %
HDLC SERPL-MCNC: 85 MG/DL
HGB BLD-MCNC: 13 G/DL
IMM GRANULOCYTES NFR BLD AUTO: 0.2 %
LDLC SERPL CALC-MCNC: 99 MG/DL
LYMPHOCYTES # BLD AUTO: 1.46 K/UL
LYMPHOCYTES NFR BLD AUTO: 31.3 %
MAN DIFF?: NORMAL
MCHC RBC-ENTMCNC: 31.7 PG
MCHC RBC-ENTMCNC: 32.5 G/DL
MCV RBC AUTO: 97.6 FL
MONOCYTES # BLD AUTO: 0.3 K/UL
MONOCYTES NFR BLD AUTO: 6.4 %
NEUTROPHILS # BLD AUTO: 2.8 K/UL
NEUTROPHILS NFR BLD AUTO: 60 %
NONHDLC SERPL-MCNC: 112 MG/DL
PLATELET # BLD AUTO: 202 K/UL
POTASSIUM SERPL-SCNC: 4.6 MMOL/L
PROT SERPL-MCNC: 7.3 G/DL
RBC # BLD: 4.1 M/UL
RBC # FLD: 12 %
SODIUM SERPL-SCNC: 140 MMOL/L
TRIGL SERPL-MCNC: 72 MG/DL
TSH SERPL-ACNC: 1.51 UIU/ML
WBC # FLD AUTO: 4.67 K/UL

## 2025-04-25 ENCOUNTER — RX RENEWAL (OUTPATIENT)
Age: 45
End: 2025-04-25

## 2025-07-19 ENCOUNTER — NON-APPOINTMENT (OUTPATIENT)
Age: 45
End: 2025-07-19

## 2025-08-27 ENCOUNTER — NON-APPOINTMENT (OUTPATIENT)
Age: 45
End: 2025-08-27